# Patient Record
Sex: FEMALE | Race: BLACK OR AFRICAN AMERICAN | NOT HISPANIC OR LATINO | URBAN - METROPOLITAN AREA
[De-identification: names, ages, dates, MRNs, and addresses within clinical notes are randomized per-mention and may not be internally consistent; named-entity substitution may affect disease eponyms.]

---

## 2024-01-23 ENCOUNTER — ON CAMPUS - OUTPATIENT (OUTPATIENT)
Dept: URBAN - METROPOLITAN AREA HOSPITAL 105 | Facility: HOSPITAL | Age: 39
End: 2024-01-23
Payer: COMMERCIAL

## 2024-01-23 DIAGNOSIS — R10.31 RIGHT LOWER QUADRANT PAIN: ICD-10-CM

## 2024-01-23 DIAGNOSIS — K63.89 OTHER SPECIFIED DISEASES OF INTESTINE: ICD-10-CM

## 2024-01-23 DIAGNOSIS — K59.09 OTHER CONSTIPATION: ICD-10-CM

## 2024-01-23 DIAGNOSIS — K76.89 OTHER SPECIFIED DISEASES OF LIVER: ICD-10-CM

## 2024-01-23 PROCEDURE — 99244 OFF/OP CNSLTJ NEW/EST MOD 40: CPT | Mod: 25 | Performed by: NURSE PRACTITIONER

## 2024-01-23 PROCEDURE — 45380 COLONOSCOPY AND BIOPSY: CPT | Performed by: INTERNAL MEDICINE

## 2024-01-24 ENCOUNTER — OFFICE (OUTPATIENT)
Dept: URBAN - METROPOLITAN AREA CLINIC 16 | Facility: CLINIC | Age: 39
End: 2024-01-24
Payer: COMMERCIAL

## 2024-01-24 VITALS
DIASTOLIC BLOOD PRESSURE: 78 MMHG | SYSTOLIC BLOOD PRESSURE: 116 MMHG | WEIGHT: 240 LBS | HEIGHT: 68 IN | HEART RATE: 63 BPM | OXYGEN SATURATION: 99 %

## 2024-01-24 DIAGNOSIS — K76.9 LIVER DISEASE, UNSPECIFIED: ICD-10-CM

## 2024-01-24 DIAGNOSIS — R19.4 CHANGE IN BOWEL HABIT: ICD-10-CM

## 2024-01-24 PROCEDURE — 99205 OFFICE O/P NEW HI 60 MIN: CPT | Performed by: INTERNAL MEDICINE

## 2024-01-24 NOTE — SERVICEHPINOTES
Zeny Davis   is seen today for a follow-up visit.     She was seen in [facility] yesterday and had abdominal pain with constipation and had taken a laxative without good success. \ She has the Mirena for birth control and she does have a history of some mild reflux and has been on famotidine in the past. She has been on vitamin D3 from her medical team at [address] [place] [address].  She has not had blood in the stool but she does have a change in bowels over the past  10 year. She has 1-3 bowel movements in 2 weeks. She will be started on medical treatment for constipation and 0I reviewed her hospitalization and she had a CAT scan for right lower quadrant abdominal discomfort and this showed mild small bowel distention consistent with ileitis and a left lobe liver lesion of uncertain etiology. This had a 3 x 2 cm low-density lesion in the liver. The bowel was normally distended with no focal abnormality in the terminal ileum did not show evidence of Crohn's and there was a suggestion of a small bowel stool sign of the ileum   . She discussed her findings for colonoscopy with ileoscopy and that test was normal biopsies are pending and recommended GI follow-up outpatient. She will be placed on MiraLAX 17 g in 8 ounces up to 3 times a day and I can even consider Trulance as well
tristian Carreno will have an ultrasound for liver lesions found incidentally on hospitalization. She will have an ultrasound for evaluation accordingly. If concern then MRCP may be indicated she does have chronic constipation for 10 years and right lower quadrant pain and colonoscopy was unrevealing for disease such as Crohn's or colon polyps. She did have biopsies and we will review these when we obtain them accordingly from recent hospitalization on January 23, 2024

## 2024-01-24 NOTE — SERVICENOTES
Impression:
 April has worsening constipation and only 1-3 bowel movements every 2 weeks she is not bleeding or losing weight and no family history of Crohn's or colon cancer she will need to have FIT testing and CRP for completeness she did have ER evaluation at [facility] but blood work was okay for hemoglobin and other markers

## 2024-01-25 LAB
ALPHA-FETOPROTEIN (AFP), TUMOR MARKER: 2.4 NG/ML
TSH: 0.91 MCIU/ML